# Patient Record
Sex: FEMALE | Race: WHITE | NOT HISPANIC OR LATINO | Employment: OTHER | URBAN - METROPOLITAN AREA
[De-identification: names, ages, dates, MRNs, and addresses within clinical notes are randomized per-mention and may not be internally consistent; named-entity substitution may affect disease eponyms.]

---

## 2023-04-27 ENCOUNTER — TELEPHONE (OUTPATIENT)
Dept: PRIMARY CARE | Facility: CLINIC | Age: 78
End: 2023-04-27
Payer: MEDICARE

## 2023-04-27 DIAGNOSIS — I10 HYPERTENSION, UNSPECIFIED TYPE: ICD-10-CM

## 2023-04-27 RX ORDER — LISINOPRIL 5 MG/1
TABLET ORAL
Qty: 90 TABLET | Refills: 1 | Status: SHIPPED | OUTPATIENT
Start: 2023-04-27 | End: 2023-10-20 | Stop reason: SDUPTHER

## 2023-04-27 RX ORDER — LISINOPRIL 5 MG/1
TABLET ORAL
COMMUNITY
Start: 2020-01-06 | End: 2023-04-27 | Stop reason: SDUPTHER

## 2023-04-27 RX ORDER — LISINOPRIL 2.5 MG/1
TABLET ORAL
Qty: 90 TABLET | Refills: 1 | Status: SHIPPED | OUTPATIENT
Start: 2023-04-27 | End: 2023-10-20 | Stop reason: SDUPTHER

## 2023-04-27 RX ORDER — LISINOPRIL 2.5 MG/1
TABLET ORAL
COMMUNITY
Start: 2022-06-27 | End: 2023-04-27 | Stop reason: SDUPTHER

## 2023-04-27 NOTE — TELEPHONE ENCOUNTER
Pt needs lisinopril 90 days with refills sent to [facility]

## 2023-04-28 LAB
NON-UH HIE A/G RATIO: 1.4
NON-UH HIE ALB: 4 G/DL (ref 3.4–5)
NON-UH HIE ALK PHOS: 81 UNIT/L (ref 46–116)
NON-UH HIE BILIRUBIN, TOTAL: 0.9 MG/DL (ref 0.2–1)
NON-UH HIE BUN/CREAT RATIO: 18.8
NON-UH HIE BUN: 15 MG/DL (ref 9–23)
NON-UH HIE CALCIUM: 9.3 MG/DL (ref 8.7–10.4)
NON-UH HIE CALCULATED LDL CHOLESTEROL: 107 MG/DL (ref 60–130)
NON-UH HIE CALCULATED OSMOLALITY: 283 MOSM/KG (ref 275–295)
NON-UH HIE CHLORIDE: 108 MMOL/L (ref 98–107)
NON-UH HIE CHOLESTEROL: 207 MG/DL (ref 100–200)
NON-UH HIE CO2, VENOUS: 26 MMOL/L (ref 20–31)
NON-UH HIE CREATININE: 0.8 MG/DL (ref 0.5–0.8)
NON-UH HIE FREE T4: 1.45 NG/DL (ref 0.89–1.76)
NON-UH HIE GFR AA: >60
NON-UH HIE GLOBULIN: 2.9 G/DL
NON-UH HIE GLOMERULAR FILTRATION RATE: >60 ML/MIN/1.73M?
NON-UH HIE GLUCOSE: 88 MG/DL (ref 74–106)
NON-UH HIE GOT: 18 UNIT/L (ref 15–37)
NON-UH HIE GPT: 12 UNIT/L (ref 10–49)
NON-UH HIE HDL CHOLESTEROL: 77 MG/DL (ref 40–60)
NON-UH HIE K: 4 MMOL/L (ref 3.5–5.1)
NON-UH HIE NA: 142 MMOL/L (ref 135–145)
NON-UH HIE TOTAL CHOL/HDL CHOL RATIO: 2.7
NON-UH HIE TOTAL PROTEIN: 6.9 G/DL (ref 5.7–8.2)
NON-UH HIE TRIGLYCERIDES: 113 MG/DL (ref 30–150)
NON-UH HIE TSH: 2.51 UIU/ML (ref 0.55–4.78)

## 2023-05-02 ENCOUNTER — TELEPHONE (OUTPATIENT)
Dept: PRIMARY CARE | Facility: CLINIC | Age: 78
End: 2023-05-02
Payer: MEDICARE

## 2023-05-02 NOTE — TELEPHONE ENCOUNTER
----- Message from Chetna Parmar DO sent at 4/30/2023  5:46 PM EDT -----  Please let pt know that her sugar, electrolytes, thyroid, liver, and kidney function are all normal and cholesterol was good.

## 2023-06-05 DIAGNOSIS — E03.9 HYPOTHYROIDISM, UNSPECIFIED TYPE: ICD-10-CM

## 2023-06-05 RX ORDER — LEVOTHYROXINE SODIUM 50 UG/1
50 TABLET ORAL
Qty: 90 TABLET | Refills: 1 | Status: SHIPPED | OUTPATIENT
Start: 2023-06-05 | End: 2023-11-20

## 2023-06-05 RX ORDER — LEVOTHYROXINE SODIUM 50 UG/1
1 TABLET ORAL
COMMUNITY
Start: 2016-09-22 | End: 2023-06-05 | Stop reason: SDUPTHER

## 2023-06-05 NOTE — TELEPHONE ENCOUNTER
Patient need a refill on her lyvothyrozine 90 day supply sent to [facility] mail order.

## 2023-06-09 ENCOUNTER — TELEPHONE (OUTPATIENT)
Dept: PRIMARY CARE | Facility: CLINIC | Age: 78
End: 2023-06-09
Payer: MEDICARE

## 2023-06-09 NOTE — TELEPHONE ENCOUNTER
Patient got her levethyroxine  50mg. But she needs the 75mg sent to [facility] mail   [facility] AT Portal to Registered [facility] Sites Phone: [phone]   Fax: [phone]

## 2023-06-12 NOTE — TELEPHONE ENCOUNTER
Patient says she takes the 50mg and the 75mg.  She alternates doses.   She has enough of the 50mg but she needs more of the 75mg. She wants it sent to [facility]. She wants Myra to call her to clarify how she takes the drugs. She says she has been taking both for years.

## 2023-06-13 DIAGNOSIS — E03.9 HYPOTHYROIDISM, UNSPECIFIED TYPE: ICD-10-CM

## 2023-06-13 RX ORDER — LEVOTHYROXINE SODIUM 75 UG/1
75 TABLET ORAL EVERY OTHER DAY
Qty: 45 TABLET | Refills: 1 | Status: SHIPPED | OUTPATIENT
Start: 2023-06-13 | End: 2023-11-20

## 2023-07-18 ENCOUNTER — TELEPHONE (OUTPATIENT)
Dept: PRIMARY CARE | Facility: CLINIC | Age: 78
End: 2023-07-18
Payer: MEDICARE

## 2023-07-18 NOTE — TELEPHONE ENCOUNTER
Pt would like blood work order because she is feeling very tired and she would like her thyroid checked also. Then she would like her order sent to her home.

## 2023-07-19 DIAGNOSIS — I10 BENIGN ESSENTIAL HTN: ICD-10-CM

## 2023-07-19 DIAGNOSIS — D72.819 LEUKOPENIA, UNSPECIFIED TYPE: ICD-10-CM

## 2023-07-19 DIAGNOSIS — E78.5 HYPERLIPIDEMIA, UNSPECIFIED HYPERLIPIDEMIA TYPE: ICD-10-CM

## 2023-07-19 DIAGNOSIS — E03.9 HYPOTHYROIDISM, UNSPECIFIED TYPE: ICD-10-CM

## 2023-08-04 LAB
NON-UH HIE A/G RATIO: 1.3
NON-UH HIE ALB: 4.1 G/DL (ref 3.4–5)
NON-UH HIE ALK PHOS: 87 UNIT/L (ref 46–116)
NON-UH HIE BASO COUNT: 0.04 X1000 (ref 0–0.2)
NON-UH HIE BASOS %: 1 %
NON-UH HIE BILIRUBIN, TOTAL: 0.9 MG/DL (ref 0.2–1)
NON-UH HIE BUN/CREAT RATIO: 16.2
NON-UH HIE BUN: 13 MG/DL (ref 9–23)
NON-UH HIE CALCIUM: 9.7 MG/DL (ref 8.7–10.4)
NON-UH HIE CALCULATED LDL CHOLESTEROL: 123 MG/DL (ref 60–130)
NON-UH HIE CALCULATED OSMOLALITY: 281 MOSM/KG (ref 275–295)
NON-UH HIE CHLORIDE: 107 MMOL/L (ref 98–107)
NON-UH HIE CHOLESTEROL: 222 MG/DL (ref 100–200)
NON-UH HIE CO2, VENOUS: 26 MMOL/L (ref 20–31)
NON-UH HIE CREATININE: 0.8 MG/DL (ref 0.5–0.8)
NON-UH HIE DIFF?: NO
NON-UH HIE EOS COUNT: 0.07 X1000 (ref 0–0.5)
NON-UH HIE EOSIN %: 2 %
NON-UH HIE FREE T4: 1.42 NG/DL (ref 0.89–1.76)
NON-UH HIE GFR AA: >60
NON-UH HIE GLOBULIN: 3.1 G/DL
NON-UH HIE GLOMERULAR FILTRATION RATE: >60 ML/MIN/1.73M?
NON-UH HIE GLUCOSE: 86 MG/DL (ref 74–106)
NON-UH HIE GOT: 20 UNIT/L (ref 15–37)
NON-UH HIE GPT: 13 UNIT/L (ref 10–49)
NON-UH HIE HCT: 40.9 % (ref 36–46)
NON-UH HIE HDL CHOLESTEROL: 76 MG/DL (ref 40–60)
NON-UH HIE HGB: 13.8 G/DL (ref 12–16)
NON-UH HIE INSTR WBC: 3.6
NON-UH HIE K: 4.2 MMOL/L (ref 3.5–5.1)
NON-UH HIE LYMPH %: 42.1 %
NON-UH HIE LYMPH COUNT: 1.53 X1000 (ref 1.2–4.8)
NON-UH HIE MCH: 30.4 PG (ref 27–34)
NON-UH HIE MCHC: 33.8 G/DL (ref 32–37)
NON-UH HIE MCV: 89.9 FL (ref 80–100)
NON-UH HIE MONO %: 6.8 %
NON-UH HIE MONO COUNT: 0.25 X1000 (ref 0.1–1)
NON-UH HIE MPV: 7.2 FL (ref 7.4–10.4)
NON-UH HIE NA: 141 MMOL/L (ref 135–145)
NON-UH HIE NEUTROPHIL %: 48.1 %
NON-UH HIE NEUTROPHIL COUNT (ANC): 1.75 X1000 (ref 1.4–8.8)
NON-UH HIE NUCLEATED RBC: 0 /100WBC
NON-UH HIE PLATELET: 219 X10 (ref 150–450)
NON-UH HIE RBC: 4.55 X10 (ref 4.2–5.4)
NON-UH HIE RDW: 13.1 % (ref 11.5–14.5)
NON-UH HIE TOTAL CHOL/HDL CHOL RATIO: 2.9
NON-UH HIE TOTAL PROTEIN: 7.2 G/DL (ref 5.7–8.2)
NON-UH HIE TRIGLYCERIDES: 113 MG/DL (ref 30–150)
NON-UH HIE TSH: 3.18 UIU/ML (ref 0.55–4.78)
NON-UH HIE WBC: 3.6 X10 (ref 4.5–11)

## 2023-08-07 ENCOUNTER — TELEPHONE (OUTPATIENT)
Dept: PRIMARY CARE | Facility: CLINIC | Age: 78
End: 2023-08-07
Payer: MEDICARE

## 2023-08-07 NOTE — TELEPHONE ENCOUNTER
----- Message from Chetna Parmar DO sent at 8/6/2023  8:35 PM EDT -----  Please let pt know that her sugar, electrolytes, thyroid, liver, and kidney function are all normal.   Her white blood cell count was again slightly low but stable.   Her cholesterol was slightly elevated.   I recommend a healthy diet and exercise and we will cont to monitor

## 2023-08-24 ENCOUNTER — TELEPHONE (OUTPATIENT)
Dept: PRIMARY CARE | Facility: CLINIC | Age: 78
End: 2023-08-24
Payer: MEDICARE

## 2023-08-24 DIAGNOSIS — K21.9 GASTROESOPHAGEAL REFLUX DISEASE WITHOUT ESOPHAGITIS: ICD-10-CM

## 2023-08-24 RX ORDER — PANTOPRAZOLE SODIUM 20 MG/1
20 TABLET, DELAYED RELEASE ORAL EVERY OTHER DAY
Qty: 45 TABLET | Refills: 1 | Status: SHIPPED | OUTPATIENT
Start: 2023-08-24 | End: 2024-03-04

## 2023-08-24 RX ORDER — PANTOPRAZOLE SODIUM 20 MG/1
1 TABLET, DELAYED RELEASE ORAL EVERY OTHER DAY
COMMUNITY
Start: 2014-06-03 | End: 2023-08-24 | Stop reason: SDUPTHER

## 2023-08-24 NOTE — TELEPHONE ENCOUNTER
Patient needs a refill on her panroprizole 20mg for her stomach gastic problems.  She needs a 3 month supply sent to [facility] [place] [facility] - [facility] AT Portal to Registered [facility] Sites   Phone: [phone]

## 2023-09-05 ENCOUNTER — TELEPHONE (OUTPATIENT)
Dept: PRIMARY CARE | Facility: CLINIC | Age: 78
End: 2023-09-05
Payer: MEDICARE

## 2023-10-03 ENCOUNTER — CLINICAL SUPPORT (OUTPATIENT)
Dept: PRIMARY CARE | Facility: CLINIC | Age: 78
End: 2023-10-03
Payer: MEDICARE

## 2023-10-03 DIAGNOSIS — Z23 ENCOUNTER FOR IMMUNIZATION: ICD-10-CM

## 2023-10-03 PROCEDURE — 90662 IIV NO PRSV INCREASED AG IM: CPT | Performed by: FAMILY MEDICINE

## 2023-10-03 PROCEDURE — G0008 ADMIN INFLUENZA VIRUS VAC: HCPCS | Performed by: FAMILY MEDICINE

## 2023-10-10 ENCOUNTER — HOSPITAL ENCOUNTER (OUTPATIENT)
Dept: RADIOLOGY | Facility: EXTERNAL LOCATION | Age: 78
Discharge: HOME | End: 2023-10-10

## 2023-10-10 ENCOUNTER — OFFICE VISIT (OUTPATIENT)
Dept: PRIMARY CARE | Facility: CLINIC | Age: 78
End: 2023-10-10
Payer: MEDICARE

## 2023-10-10 VITALS
HEART RATE: 70 BPM | HEIGHT: 59 IN | SYSTOLIC BLOOD PRESSURE: 138 MMHG | BODY MASS INDEX: 24.52 KG/M2 | WEIGHT: 121.6 LBS | TEMPERATURE: 97.1 F | DIASTOLIC BLOOD PRESSURE: 72 MMHG

## 2023-10-10 DIAGNOSIS — Z12.31 BREAST CANCER SCREENING BY MAMMOGRAM: ICD-10-CM

## 2023-10-10 DIAGNOSIS — E78.5 HYPERLIPIDEMIA, UNSPECIFIED HYPERLIPIDEMIA TYPE: ICD-10-CM

## 2023-10-10 DIAGNOSIS — M25.562 LEFT KNEE PAIN, UNSPECIFIED CHRONICITY: ICD-10-CM

## 2023-10-10 DIAGNOSIS — E03.9 HYPOTHYROIDISM, UNSPECIFIED TYPE: ICD-10-CM

## 2023-10-10 DIAGNOSIS — D72.819 LEUKOPENIA, UNSPECIFIED TYPE: ICD-10-CM

## 2023-10-10 DIAGNOSIS — I10 BENIGN ESSENTIAL HTN: ICD-10-CM

## 2023-10-10 DIAGNOSIS — Z00.00 MEDICARE ANNUAL WELLNESS VISIT, SUBSEQUENT: Primary | ICD-10-CM

## 2023-10-10 DIAGNOSIS — E55.9 VITAMIN D DEFICIENCY: ICD-10-CM

## 2023-10-10 PROBLEM — I72.8 ANEURYSM, SPLENIC ARTERY (CMS-HCC): Status: ACTIVE | Noted: 2023-10-10

## 2023-10-10 PROBLEM — K63.5 COLON POLYPS: Status: ACTIVE | Noted: 2023-10-10

## 2023-10-10 PROBLEM — R53.83 FATIGUE: Status: ACTIVE | Noted: 2023-10-10

## 2023-10-10 PROCEDURE — 1036F TOBACCO NON-USER: CPT | Performed by: FAMILY MEDICINE

## 2023-10-10 PROCEDURE — 1159F MED LIST DOCD IN RCRD: CPT | Performed by: FAMILY MEDICINE

## 2023-10-10 PROCEDURE — 1160F RVW MEDS BY RX/DR IN RCRD: CPT | Performed by: FAMILY MEDICINE

## 2023-10-10 PROCEDURE — 3075F SYST BP GE 130 - 139MM HG: CPT | Performed by: FAMILY MEDICINE

## 2023-10-10 PROCEDURE — G0439 PPPS, SUBSEQ VISIT: HCPCS | Performed by: FAMILY MEDICINE

## 2023-10-10 PROCEDURE — 1170F FXNL STATUS ASSESSED: CPT | Performed by: FAMILY MEDICINE

## 2023-10-10 PROCEDURE — 3078F DIAST BP <80 MM HG: CPT | Performed by: FAMILY MEDICINE

## 2023-10-10 RX ORDER — CHOLECALCIFEROL (VITAMIN D3) 50 MCG
TABLET ORAL
COMMUNITY
Start: 2018-07-23

## 2023-10-10 RX ORDER — ASPIRIN 81 MG/1
1 TABLET ORAL EVERY OTHER DAY
COMMUNITY
Start: 2016-06-20

## 2023-10-10 RX ORDER — PRAVASTATIN SODIUM 20 MG/1
20 TABLET ORAL EVERY 24 HOURS
COMMUNITY
Start: 2019-02-14 | End: 2023-10-20 | Stop reason: SDUPTHER

## 2023-10-10 ASSESSMENT — PATIENT HEALTH QUESTIONNAIRE - PHQ9
SUM OF ALL RESPONSES TO PHQ9 QUESTIONS 1 AND 2: 0
1. LITTLE INTEREST OR PLEASURE IN DOING THINGS: NOT AT ALL
2. FEELING DOWN, DEPRESSED OR HOPELESS: NOT AT ALL

## 2023-10-10 ASSESSMENT — ACTIVITIES OF DAILY LIVING (ADL)
MANAGING_FINANCES: INDEPENDENT
DOING_HOUSEWORK: INDEPENDENT
DRESSING: INDEPENDENT
GROCERY_SHOPPING: INDEPENDENT
BATHING: INDEPENDENT
TAKING_MEDICATION: INDEPENDENT

## 2023-10-10 NOTE — PROGRESS NOTES
Subjective   Reason for Visit: Shante Granados is an 78 y.o. female here for a Medicare Wellness visit.     Past Medical, Surgical, and Family History reviewed and updated in chart.    Reviewed all medications by prescribing practitioner or clinical pharmacist (such as prescriptions, OTCs, herbal therapies and supplements) and documented in the medical record.    HPI  79 yo female presents for medicare wellness visit       recent labs 8/4/23  vit D, cmp, Tsh/ free T4 wnl    WBC 3.6     hx HTN- doing well on lisinopril 7.5mg;   occ checks BP- 107- 110s/70s at home      hx HLD- on pravastatin; watches diet and stays active.     hx hypothyroidism-on levothyroxine 50mcg/75mcg alternating  thyroid biopsy in 20s-diagnosed with hashimotos. no neck changes.   c/o fatigue     hx leukopenia- hasn't seen a heme for in past. has been stable for yrs.      2010 dexa-osteopenia   defers f/u DEXA b/c wont take any meds for it.   takes vit D   does not take a Ca supplement - due to hx of kidney stones        sees gyn for exams/pelvics every other yr- dr avila- recently saw gyn and has order for mammo  hx ovarian cysts  10/2022 mammo; no breast changes. does reg self breast exams     colonoscopy 5/2019- +polyps. f/u 5 yrs. no bowel changes. Due 5/2024     hx barretts in past but recent EGD 5/2019 was neg for barretts; GI dr key; on pantoprazole 20mg QOD; seems to control reflux.         Zostavax 2010; shingrix-had  Pneumovax at 65yo   had prevnar 13 in nov 2015  flu shot- had  covid shots- had     wears glasses and sees optho. dr guajardo;   She has seen her dentist for regular cleanings   She denies any chest pains, palpitations, edema, shortness of breath, abdominal pains, nausea, vomiting, diarrhea, constipation, blood in stool, melena.   Sees derm for skin checks. dr salas. hx rosacea     hx of splenic aneurysm- sees vascular dr gonzalez 2020;   Saw dr kwon neurosjayleen for possible aneurysm- monitoring    GI dr key-  "liver cyts were stable.      Co left knee pain- worsening; with stairs.  Husbands ortho-dr catherine           /72   Pulse 70   Temp 36.2 °C (97.1 °F)   Ht 1.499 m (4' 11\")   Wt 55.2 kg (121 lb 9.6 oz)   BMI 24.56 kg/m²     Lab Results   Component Value Date    WBC 3.8 (L) 03/12/2020    HGB 14.0 03/12/2020    HCT 42.4 03/12/2020    MCV 90 03/12/2020     03/12/2020       Chemistry    Lab Results   Component Value Date/Time     03/12/2020 1146    K 4.0 03/12/2020 1146     03/12/2020 1146    CO2 25 03/12/2020 1146    BUN 11 03/12/2020 1146    CREATININE 0.69 03/12/2020 1146    Lab Results   Component Value Date/Time    CALCIUM 9.9 03/12/2020 1146    ALKPHOS 76 03/12/2020 1146    AST 21 03/12/2020 1146    ALT 16 03/12/2020 1146    BILITOT 0.9 03/12/2020 1146           Lab Results   Component Value Date    CHOL 235 (H) 03/12/2020    CHOL 225 (H) 08/29/2019    CHOL 232 (H) 04/29/2019     Lab Results   Component Value Date    HDL 76.9 03/12/2020    HDL 74.3 08/29/2019    HDL 70.6 04/29/2019     No results found for: \"LDLCALC\"  Lab Results   Component Value Date    TRIG 93 03/12/2020    TRIG 129 08/29/2019    TRIG 161 (H) 04/29/2019     No components found for: \"CHOLHDL\"    Patient Self Assessment of Health Status  Patient Self Assessment: Good    Nutrition and Exercise  Current Diet: Well Balanced Diet  Adequate Fluid Intake: Yes  Caffeine: Yes  Exercise Frequency: Regularly    Functional Ability/Level of Safety  Cognitive Impairment Observed: No cognitive impairment observed    Home Safety Risk Factors: None    Patient Care Team:  Chetna Parmar DO as PCP - General  Chetna Parmar DO as PCP - [facility] Attributed Provider     Review of Systems  ROS as stated in HPI    Medicare Wellness Billing Compliance Satisfied    *This is a visual tool to show completion of required items on the day of the visit. Green checks will only appear on the date of visit.      Objective   Vitals:  /72   " "Pulse 70   Temp 36.2 °C (97.1 °F)   Ht 1.499 m (4' 11\")   Wt 55.2 kg (121 lb 9.6 oz)   BMI 24.56 kg/m²       Physical Exam  Constitutional: A&O; NAD  HEENT: conjunctiva normal. EOMI; PERRLA; lids normal; TM's clear;   Neck: supple; No lymphadenopathy   Heart: RRR     Lungs: CTA bilateral   Abd: Soft, Nontender, Nondistended  Ext:  No edema;    Neuro: No gross neuro deficits     Psych: Judgment, orientation, mood, affect, insight wnl   Assessment/Plan   Problem List Items Addressed This Visit       Hypothyroid    Relevant Orders    Thyroid Stimulating Hormone    T4, free    Benign essential HTN    Relevant Orders    Comprehensive Metabolic Panel    Hyperlipidemia    Relevant Orders    Lipid Panel    Comprehensive Metabolic Panel    Leukopenia    Relevant Orders    CBC and Auto Differential    Vitamin D deficiency    Relevant Orders    Vitamin D 25-Hydroxy,Total (for eval of Vitamin D levels)     Other Visit Diagnoses       Medicare annual wellness visit, subsequent    -  Primary    Breast cancer screening by mammogram        Relevant Orders    BI mammo bilateral screening tomosynthesis (Completed)    Left knee pain, unspecified chronicity        Relevant Orders    Referral to Physical Therapy    XR knee left 4+ views (Completed)        reviewed recent labs from aug  Recheck labs in a few mo  sees gyn for exams every other yr- dr avila   10/2022 mammo- recheck mammo- has order from gyn  colonoscopy 5/2019- f/u 5 yrs due 5/2024  immunizations UTD  defers f/u bone density test.  recommend Calcium + Vitamin D supplement.   healthy lifestyle-diet, exercise.   cont to monitor BP   f/u with vascular/neurosurg   check xray left knee and refer to PT; fu with ortho catherine if persists.       "

## 2023-10-20 DIAGNOSIS — E78.5 HYPERLIPIDEMIA, UNSPECIFIED HYPERLIPIDEMIA TYPE: ICD-10-CM

## 2023-10-20 DIAGNOSIS — I10 HYPERTENSION, UNSPECIFIED TYPE: ICD-10-CM

## 2023-10-20 RX ORDER — PRAVASTATIN SODIUM 20 MG/1
20 TABLET ORAL DAILY
Qty: 90 TABLET | Refills: 1 | Status: SHIPPED | OUTPATIENT
Start: 2023-10-20 | End: 2024-01-09 | Stop reason: SDUPTHER

## 2023-10-20 RX ORDER — LISINOPRIL 2.5 MG/1
TABLET ORAL
Qty: 90 TABLET | Refills: 1 | Status: SHIPPED | OUTPATIENT
Start: 2023-10-20 | End: 2024-01-09 | Stop reason: SDUPTHER

## 2023-10-20 RX ORDER — LISINOPRIL 5 MG/1
TABLET ORAL
Qty: 90 TABLET | Refills: 1 | Status: SHIPPED | OUTPATIENT
Start: 2023-10-20 | End: 2024-01-09 | Stop reason: SDUPTHER

## 2023-11-20 DIAGNOSIS — E03.9 HYPOTHYROIDISM, UNSPECIFIED TYPE: ICD-10-CM

## 2023-11-20 RX ORDER — LEVOTHYROXINE SODIUM 50 UG/1
50 TABLET ORAL EVERY MORNING
Qty: 90 TABLET | Refills: 1 | Status: SHIPPED | OUTPATIENT
Start: 2023-11-20

## 2023-11-20 RX ORDER — LEVOTHYROXINE SODIUM 75 UG/1
75 TABLET ORAL EVERY OTHER DAY
Qty: 45 TABLET | Refills: 1 | Status: SHIPPED | OUTPATIENT
Start: 2023-11-20

## 2023-12-18 ENCOUNTER — TELEPHONE (OUTPATIENT)
Dept: PRIMARY CARE | Facility: CLINIC | Age: 78
End: 2023-12-18
Payer: MEDICARE

## 2023-12-18 NOTE — TELEPHONE ENCOUNTER
----- Message from Chetna Parmar DO sent at 12/15/2023 12:58 PM EST -----  Let pt know the xray of her knee showed mild to moderate osteoarthritic changes.  I recommend she fu with her ortho if her symptoms persist    ----- Message -----  From: Dior Garvey  Sent: 12/14/2023  12:46 PM EST  To: Chetna Parmar DO

## 2024-01-03 LAB
NON-UH HIE A/G RATIO: 1.3
NON-UH HIE ALB: 4 G/DL (ref 3.4–5)
NON-UH HIE ALK PHOS: 82 UNIT/L (ref 45–117)
NON-UH HIE BASO COUNT: 0.04 X1000 (ref 0–0.2)
NON-UH HIE BASOS %: 1.2 %
NON-UH HIE BILIRUBIN, TOTAL: 1 MG/DL (ref 0.3–1.2)
NON-UH HIE BUN/CREAT RATIO: 15
NON-UH HIE BUN: 12 MG/DL (ref 9–23)
NON-UH HIE CALCIUM: 9.3 MG/DL (ref 8.7–10.4)
NON-UH HIE CALCULATED LDL CHOLESTEROL: 116 MG/DL (ref 60–130)
NON-UH HIE CALCULATED OSMOLALITY: 282 MOSM/KG (ref 275–295)
NON-UH HIE CHLORIDE: 108 MMOL/L (ref 98–107)
NON-UH HIE CHOLESTEROL: 225 MG/DL (ref 100–200)
NON-UH HIE CO2, VENOUS: 28 MMOL/L (ref 20–31)
NON-UH HIE CREATININE: 0.8 MG/DL (ref 0.5–0.8)
NON-UH HIE DIFF?: NO
NON-UH HIE EOS COUNT: 0.07 X1000 (ref 0–0.5)
NON-UH HIE EOSIN %: 1.9 %
NON-UH HIE FREE T4: 1.59 NG/DL (ref 0.89–1.76)
NON-UH HIE GFR AA: >60
NON-UH HIE GLOBULIN: 3 G/DL
NON-UH HIE GLOMERULAR FILTRATION RATE: >60 ML/MIN/1.73M?
NON-UH HIE GLUCOSE: 90 MG/DL (ref 74–106)
NON-UH HIE GOT: 21 UNIT/L (ref 15–37)
NON-UH HIE GPT: 14 UNIT/L (ref 10–49)
NON-UH HIE HCT: 40.2 % (ref 36–46)
NON-UH HIE HDL CHOLESTEROL: 90 MG/DL (ref 40–60)
NON-UH HIE HGB: 13.6 G/DL (ref 12–16)
NON-UH HIE INSTR WBC: 3.5
NON-UH HIE K: 4 MMOL/L (ref 3.5–5.1)
NON-UH HIE LYMPH %: 36.3 %
NON-UH HIE LYMPH COUNT: 1.28 X1000 (ref 1.2–4.8)
NON-UH HIE MCH: 30.5 PG (ref 27–34)
NON-UH HIE MCHC: 33.7 G/DL (ref 32–37)
NON-UH HIE MCV: 90.6 FL (ref 80–100)
NON-UH HIE MONO %: 7.5 %
NON-UH HIE MONO COUNT: 0.26 X1000 (ref 0.1–1)
NON-UH HIE MPV: 7.1 FL (ref 7.4–10.4)
NON-UH HIE NA: 142 MMOL/L (ref 135–145)
NON-UH HIE NEUTROPHIL %: 53.1 %
NON-UH HIE NEUTROPHIL COUNT (ANC): 1.87 X1000 (ref 1.4–8.8)
NON-UH HIE NUCLEATED RBC: 0 /100WBC
NON-UH HIE PLATELET: 220 X10 (ref 150–450)
NON-UH HIE RBC: 4.44 X10 (ref 4.2–5.4)
NON-UH HIE RDW: 13.5 % (ref 11.5–14.5)
NON-UH HIE TOTAL CHOL/HDL CHOL RATIO: 2.5
NON-UH HIE TOTAL PROTEIN: 7 G/DL (ref 5.7–8.2)
NON-UH HIE TRIGLYCERIDES: 96 MG/DL (ref 30–150)
NON-UH HIE TSH: 2.16 UIU/ML (ref 0.55–4.78)
NON-UH HIE VIT D 25: 45 NG/ML
NON-UH HIE WBC: 3.5 X10 (ref 4.5–11)

## 2024-01-09 ENCOUNTER — OFFICE VISIT (OUTPATIENT)
Dept: PRIMARY CARE | Facility: CLINIC | Age: 79
End: 2024-01-09
Payer: MEDICARE

## 2024-01-09 VITALS
WEIGHT: 122 LBS | HEART RATE: 71 BPM | HEIGHT: 60 IN | SYSTOLIC BLOOD PRESSURE: 142 MMHG | TEMPERATURE: 97.9 F | BODY MASS INDEX: 23.95 KG/M2 | DIASTOLIC BLOOD PRESSURE: 83 MMHG

## 2024-01-09 DIAGNOSIS — E78.5 HYPERLIPIDEMIA, UNSPECIFIED HYPERLIPIDEMIA TYPE: ICD-10-CM

## 2024-01-09 DIAGNOSIS — I10 BENIGN ESSENTIAL HTN: Primary | ICD-10-CM

## 2024-01-09 DIAGNOSIS — E55.9 VITAMIN D DEFICIENCY: ICD-10-CM

## 2024-01-09 DIAGNOSIS — I10 HYPERTENSION, UNSPECIFIED TYPE: ICD-10-CM

## 2024-01-09 DIAGNOSIS — E03.9 HYPOTHYROIDISM, UNSPECIFIED TYPE: ICD-10-CM

## 2024-01-09 DIAGNOSIS — D72.819 LEUKOPENIA, UNSPECIFIED TYPE: ICD-10-CM

## 2024-01-09 PROCEDURE — 3079F DIAST BP 80-89 MM HG: CPT | Performed by: FAMILY MEDICINE

## 2024-01-09 PROCEDURE — 99214 OFFICE O/P EST MOD 30 MIN: CPT | Performed by: FAMILY MEDICINE

## 2024-01-09 PROCEDURE — 1036F TOBACCO NON-USER: CPT | Performed by: FAMILY MEDICINE

## 2024-01-09 PROCEDURE — 1159F MED LIST DOCD IN RCRD: CPT | Performed by: FAMILY MEDICINE

## 2024-01-09 PROCEDURE — 1158F ADVNC CARE PLAN TLK DOCD: CPT | Performed by: FAMILY MEDICINE

## 2024-01-09 PROCEDURE — 3077F SYST BP >= 140 MM HG: CPT | Performed by: FAMILY MEDICINE

## 2024-01-09 PROCEDURE — 1160F RVW MEDS BY RX/DR IN RCRD: CPT | Performed by: FAMILY MEDICINE

## 2024-01-09 PROCEDURE — 1123F ACP DISCUSS/DSCN MKR DOCD: CPT | Performed by: FAMILY MEDICINE

## 2024-01-09 RX ORDER — PRAVASTATIN SODIUM 20 MG/1
20 TABLET ORAL DAILY
Qty: 90 TABLET | Refills: 3 | Status: SHIPPED | OUTPATIENT
Start: 2024-01-09

## 2024-01-09 RX ORDER — LISINOPRIL 5 MG/1
TABLET ORAL
Qty: 90 TABLET | Refills: 3 | Status: SHIPPED | OUTPATIENT
Start: 2024-01-09

## 2024-01-09 RX ORDER — LISINOPRIL 2.5 MG/1
TABLET ORAL
Qty: 90 TABLET | Refills: 3 | Status: SHIPPED | OUTPATIENT
Start: 2024-01-09

## 2024-01-09 NOTE — PROGRESS NOTES
Subjective   Patient ID: Shante Granados is a 78 y.o. female who presents for Follow-up (Follow up hypertension;  checking BP's, they are good).    HPI   79 yo female presents for fu    Was seen 10/2023 for medicare wellness visit       recent labs       Cmp, vit D, tsh, free t4 wnl  WBC 3.5     8/4/23  vit D, cmp, Tsh/ free T4 wnl    WBC 3.6     hx HTN- doing well on lisinopril 7.5mg;   occ checks BP- 107- 110s/70s at home      hx HLD- on pravastatin; watches diet and stays active.     hx hypothyroidism-on levothyroxine 50mcg/75mcg alternating  thyroid biopsy in 20s-diagnosed with hashimotos. no neck changes.      hx leukopenia- hasn't seen a heme for in past. has been stable for yrs.      2010 dexa-osteopenia   defers f/u DEXA b/c wont take any meds for it.   takes vit D   does not take a Ca supplement - due to hx of kidney stones     sees gyn for exams/pelvics every other yr- dr avila- saw her in nov.   hx ovarian cysts  Mammo UTD    colonoscopy 5/2019- +polyps. f/u 5 yrs. no bowel changes. Due 5/2024     hx barretts in past but recent EGD 5/2019 was neg for barretts; GI dr key; on pantoprazole 20mg QOD; seems to control reflux.         Zostavax 2010; shingrix-had  Pneumovax at 67yo   had prevnar 13 in nov 2015  flu shot- had  covid shots- had  RSV- not yet      She denies any chest pains, palpitations, edema, shortness of breath, abdominal pains, nausea, vomiting, diarrhea, constipation, blood in stool, melena.     Sees derm for skin checks. dr salas. hx rosacea         Review of Systems  ROS as stated in HPI    Objective   /83   Pulse 71   Temp 36.6 °C (97.9 °F) (Temporal)   Ht 1.524 m (5')   Wt 55.3 kg (122 lb)   BMI 23.83 kg/m²     Physical Exam  Constitutional: A&O; NAD  HEENT: conjunctiva normal. EOMI; PERRLA; lids normal; TM's clear;   Neck: supple; No lymphadenopathy   Heart: RRR     Lungs: CTA bilateral   Abd: Soft, Nontender, Nondistended  Ext:  No edema;    Neuro:  No gross neuro deficits     Psych: Judgment, orientation, mood, affect, insight wnl   Assessment/Plan   Problem List Items Addressed This Visit             ICD-10-CM    Hypothyroid E03.9    Relevant Orders    Thyroid Stimulating Hormone    T4, free    Benign essential HTN - Primary I10    Relevant Medications    lisinopril 2.5 mg tablet    lisinopril 5 mg tablet    Other Relevant Orders    Comprehensive Metabolic Panel    Hyperlipidemia E78.5    Relevant Medications    pravastatin (Pravachol) 20 mg tablet    Other Relevant Orders    Lipid Panel    Comprehensive Metabolic Panel    Leukopenia D72.819    Relevant Orders    CBC and Auto Differential    Vitamin D deficiency E55.9    Relevant Orders    Vitamin D 25-Hydroxy,Total (for eval of Vitamin D levels)     Other Visit Diagnoses         Codes    Hypertension, unspecified type     I10    Relevant Medications    lisinopril 2.5 mg tablet    lisinopril 5 mg tablet          reviewed recent labs  colonoscopy 5/2019- f/u 5 yrs due 5/2024  RSV recommended; other immunizations UTD  defers f/u bone density test.  recommend Calcium + Vitamin D supplement.   healthy lifestyle-diet, exercise.   cont to monitor BP  Fu for MWV or sooner if needed

## 2024-03-04 DIAGNOSIS — K21.9 GASTROESOPHAGEAL REFLUX DISEASE WITHOUT ESOPHAGITIS: ICD-10-CM

## 2024-03-04 RX ORDER — PANTOPRAZOLE SODIUM 20 MG/1
20 TABLET, DELAYED RELEASE ORAL EVERY OTHER DAY
Qty: 45 TABLET | Refills: 1 | Status: SHIPPED | OUTPATIENT
Start: 2024-03-04 | End: 2024-03-05 | Stop reason: SDUPTHER

## 2024-03-05 RX ORDER — PANTOPRAZOLE SODIUM 20 MG/1
20 TABLET, DELAYED RELEASE ORAL EVERY OTHER DAY
Qty: 45 TABLET | Refills: 1 | Status: SHIPPED | OUTPATIENT
Start: 2024-03-05

## 2024-04-03 ENCOUNTER — OFFICE VISIT (OUTPATIENT)
Dept: PRIMARY CARE | Facility: CLINIC | Age: 79
End: 2024-04-03
Payer: MEDICARE

## 2024-04-03 VITALS
TEMPERATURE: 97.5 F | HEIGHT: 60 IN | WEIGHT: 124.2 LBS | HEART RATE: 90 BPM | OXYGEN SATURATION: 98 % | DIASTOLIC BLOOD PRESSURE: 87 MMHG | SYSTOLIC BLOOD PRESSURE: 150 MMHG | BODY MASS INDEX: 24.39 KG/M2

## 2024-04-03 DIAGNOSIS — M54.2 NECK PAIN: Primary | ICD-10-CM

## 2024-04-03 PROCEDURE — 1036F TOBACCO NON-USER: CPT | Performed by: NURSE PRACTITIONER

## 2024-04-03 PROCEDURE — 99213 OFFICE O/P EST LOW 20 MIN: CPT | Performed by: NURSE PRACTITIONER

## 2024-04-03 PROCEDURE — 1160F RVW MEDS BY RX/DR IN RCRD: CPT | Performed by: NURSE PRACTITIONER

## 2024-04-03 PROCEDURE — 1123F ACP DISCUSS/DSCN MKR DOCD: CPT | Performed by: NURSE PRACTITIONER

## 2024-04-03 PROCEDURE — 1159F MED LIST DOCD IN RCRD: CPT | Performed by: NURSE PRACTITIONER

## 2024-04-03 PROCEDURE — 3079F DIAST BP 80-89 MM HG: CPT | Performed by: NURSE PRACTITIONER

## 2024-04-03 PROCEDURE — 3077F SYST BP >= 140 MM HG: CPT | Performed by: NURSE PRACTITIONER

## 2024-04-03 RX ORDER — PREDNISONE 20 MG/1
TABLET ORAL
Qty: 15 TABLET | Refills: 0 | Status: SHIPPED
Start: 2024-04-03 | End: 2024-04-25 | Stop reason: ALTCHOICE

## 2024-04-03 ASSESSMENT — PATIENT HEALTH QUESTIONNAIRE - PHQ9
SUM OF ALL RESPONSES TO PHQ9 QUESTIONS 1 AND 2: 0
2. FEELING DOWN, DEPRESSED OR HOPELESS: NOT AT ALL
1. LITTLE INTEREST OR PLEASURE IN DOING THINGS: NOT AT ALL

## 2024-04-03 ASSESSMENT — ENCOUNTER SYMPTOMS
SHORTNESS OF BREATH: 0
WHEEZING: 0
CONSTITUTIONAL NEGATIVE: 1

## 2024-04-03 NOTE — PROGRESS NOTES
Subjective   Patient ID: Shante Granados is a 78 y.o. female who presents for Neck Pain.  Last physical: 10/10/23; has appt scheduled in oct    Bps at pharmacy- 100/70  When did neck pain start? 1.5 to 2 months ago   Where does it hurt on the neck? Rt lower neck and sometimes into back   Any fall or injury? No       HPI  Neck pain x 1.5-2mon  Rt lower neck into rt upper back  On off pain  no fall or injury  no new work activities or exercise routine-arth in knees-went to PT only for knees  Travel to [place] every mon  Nothing makes pain worse  Stress might cause this  pain right now 0/10  pain at worst 3-4/10  no redness, rash, warmth, bruising or swelling  Tightness noted  no numbness, tingling, or weakness down arms  no pain down the arms  no wt loss, fever, or chills  no cp, heart racing, sob, wheezing, HA, dizziness, or vision change  selftxt: 2 advil one day-did not make it go away;might have helped a little; aleve did not help      No care team member to display     Review of Systems   Constitutional: Negative.    Respiratory:  Negative for shortness of breath and wheezing.    Cardiovascular:  Negative for chest pain.       Objective   Visit Vitals  /87   Pulse 90   Temp 36.4 °C (97.5 °F)      BP Readings from Last 3 Encounters:   04/03/24 150/87   01/09/24 142/83   10/10/23 138/72     Wt Readings from Last 3 Encounters:   04/03/24 56.3 kg (124 lb 3.2 oz)   01/09/24 55.3 kg (122 lb)   10/10/23 55.2 kg (121 lb 9.6 oz)       Physical Exam  Constitutional:       General: She is not in acute distress.     Appearance: Normal appearance.   Neurological:      Mental Status: She is alert.       Assessment/Plan   Diagnoses and all orders for this visit:  Neck pain  -     XR cervical spine complete 4-5 views; Future  -     predniSONE (Deltasone) 20 mg tablet; Take 2 tabs daily x 5d then 1 tab daily x 3d then 1/2 tab daily x 3d

## 2024-04-03 NOTE — PATIENT INSTRUCTIONS
Xray cervical  Topical voltaren gel  Stretches  Prednisone with food  No advil, motrin, ibuprofen, aleve, naproxen or other anti-inflammatories while on prednisone.  Tylenol (acetaminophen) is OK to take.   Declines muscle relaxant  Call if not starting to get better in 2 wks---> we can add onto physical therapy for your neck, ultrasound rt upper back, refer to ortho    For cramping: magnesium 400mg 1 at bedtime    Keep oct appt with dr marquis    I will communicate with you via Bench regarding messages and results. If you need help with this, you can call the support line at [phone].    IT WAS A PLEASURE TO SEE YOU TODAY. THANK YOU FOR CHOOSING US FOR YOUR HEALTHCARE NEEDS.

## 2024-04-25 ENCOUNTER — OFFICE VISIT (OUTPATIENT)
Dept: PRIMARY CARE | Facility: CLINIC | Age: 79
End: 2024-04-25
Payer: MEDICARE

## 2024-04-25 VITALS
DIASTOLIC BLOOD PRESSURE: 80 MMHG | TEMPERATURE: 98.5 F | HEART RATE: 78 BPM | HEIGHT: 60 IN | BODY MASS INDEX: 24.11 KG/M2 | SYSTOLIC BLOOD PRESSURE: 131 MMHG | WEIGHT: 122.8 LBS

## 2024-04-25 DIAGNOSIS — J32.9 SINUSITIS, UNSPECIFIED CHRONICITY, UNSPECIFIED LOCATION: Primary | ICD-10-CM

## 2024-04-25 PROCEDURE — 1159F MED LIST DOCD IN RCRD: CPT | Performed by: FAMILY MEDICINE

## 2024-04-25 PROCEDURE — 99213 OFFICE O/P EST LOW 20 MIN: CPT | Performed by: FAMILY MEDICINE

## 2024-04-25 PROCEDURE — 3075F SYST BP GE 130 - 139MM HG: CPT | Performed by: FAMILY MEDICINE

## 2024-04-25 PROCEDURE — 1123F ACP DISCUSS/DSCN MKR DOCD: CPT | Performed by: FAMILY MEDICINE

## 2024-04-25 PROCEDURE — 1160F RVW MEDS BY RX/DR IN RCRD: CPT | Performed by: FAMILY MEDICINE

## 2024-04-25 PROCEDURE — 3079F DIAST BP 80-89 MM HG: CPT | Performed by: FAMILY MEDICINE

## 2024-04-25 PROCEDURE — 1036F TOBACCO NON-USER: CPT | Performed by: FAMILY MEDICINE

## 2024-04-25 RX ORDER — DOXYCYCLINE 100 MG/1
100 CAPSULE ORAL 2 TIMES DAILY
Qty: 20 CAPSULE | Refills: 0 | Status: SHIPPED | OUTPATIENT
Start: 2024-04-25

## 2024-04-25 ASSESSMENT — PATIENT HEALTH QUESTIONNAIRE - PHQ9
1. LITTLE INTEREST OR PLEASURE IN DOING THINGS: NOT AT ALL
SUM OF ALL RESPONSES TO PHQ9 QUESTIONS 1 AND 2: 0
2. FEELING DOWN, DEPRESSED OR HOPELESS: NOT AT ALL

## 2024-04-25 NOTE — PROGRESS NOTES
Subjective   Patient ID: Shante Granados is a 79 y.o. female who presents for Sinusitis (Sinus pressure, ear pressure for a few wks).    HPI   79-year-old female presents complaining of a few week history of persistent sinus congestion, pressure, excessive drainage, ear pressure, postnasal drainage and cough.  Denies any fevers or chills.  Has tried Sudafed and ibuprofen.  No history of allergies.  Complains of fatigue and feeling rundown.      Review of Systems  ROS as stated in HPI  Objective   /80   Pulse 78   Temp 36.9 °C (98.5 °F)   Ht 1.524 m (5')   Wt 55.7 kg (122 lb 12.8 oz)   BMI 23.98 kg/m²     Physical Exam  Constitutional: A&O; NAD  HEENT: conjunctiva normal. EOMI; PERRLA; lids normal; TM's clear; +PND  Neck: supple; No lymphadenopathy   Heart: RRR     Lungs: CTA bilateral   Neuro: No gross neuro deficits     Psych: Judgment, orientation, mood, affect, insight wnl   Assessment/Plan   Problem List Items Addressed This Visit    None  Visit Diagnoses         Codes    Sinusitis, unspecified chronicity, unspecified location    -  Primary J32.9    Relevant Medications    doxycycline (Vibramycin) 100 mg capsule        Supportive treatment.  Rx doxy  Follow-up if symptoms persist or worsen.

## 2024-05-02 ENCOUNTER — TELEPHONE (OUTPATIENT)
Dept: PRIMARY CARE | Facility: CLINIC | Age: 79
End: 2024-05-02
Payer: MEDICARE

## 2024-05-02 DIAGNOSIS — E55.9 VITAMIN D DEFICIENCY: ICD-10-CM

## 2024-05-02 DIAGNOSIS — D64.9 ANEMIA, UNSPECIFIED TYPE: ICD-10-CM

## 2024-05-02 DIAGNOSIS — R53.83 FATIGUE, UNSPECIFIED TYPE: ICD-10-CM

## 2024-05-02 DIAGNOSIS — I10 BENIGN ESSENTIAL HTN: ICD-10-CM

## 2024-05-02 DIAGNOSIS — R79.89 OTHER SPECIFIED ABNORMAL FINDINGS OF BLOOD CHEMISTRY: ICD-10-CM

## 2024-05-02 NOTE — TELEPHONE ENCOUNTER
Patient called stating that since being on the doxycycline and her BP was 86/68 and 89/62.  Worried the doxycycline makes her BP drop.  Had her stop in to have it checked it was 114/84 today.  Advised she can stop the doxycycline as she is feeling better and to monitor the next few days.  She would also like to have blood work done next week for her fatigue as its not getting better.  Per our verbal conversation, added blood work.

## 2024-05-03 LAB
NON-UH HIE A/G RATIO: 1.2
NON-UH HIE ALB: 3.8 G/DL (ref 3.4–5)
NON-UH HIE ALK PHOS: 93 UNIT/L (ref 45–117)
NON-UH HIE BASO COUNT: 0.05 X1000 (ref 0–0.2)
NON-UH HIE BASOS %: 1 %
NON-UH HIE BILIRUBIN, TOTAL: 0.5 MG/DL (ref 0.3–1.2)
NON-UH HIE BUN/CREAT RATIO: 16.2
NON-UH HIE BUN: 13 MG/DL (ref 9–23)
NON-UH HIE CALCIUM: 9.3 MG/DL (ref 8.7–10.4)
NON-UH HIE CALCULATED OSMOLALITY: 277 MOSM/KG (ref 275–295)
NON-UH HIE CHLORIDE: 105 MMOL/L (ref 98–107)
NON-UH HIE CO2, VENOUS: 26 MMOL/L (ref 20–31)
NON-UH HIE CREATININE: 0.8 MG/DL (ref 0.5–0.8)
NON-UH HIE DIFF?: NO
NON-UH HIE EOS COUNT: 0.05 X1000 (ref 0–0.5)
NON-UH HIE EOSIN %: 1 %
NON-UH HIE FERRITIN: 55 NG/ML (ref 10–291)
NON-UH HIE FREE T4: 1.48 NG/DL (ref 0.89–1.76)
NON-UH HIE GFR AA: >60
NON-UH HIE GLOBULIN: 3.3 G/DL
NON-UH HIE GLOMERULAR FILTRATION RATE: >60 ML/MIN/1.73M?
NON-UH HIE GLUCOSE: 84 MG/DL (ref 74–106)
NON-UH HIE GOT: 23 UNIT/L (ref 15–37)
NON-UH HIE GPT: 15 UNIT/L (ref 10–49)
NON-UH HIE HCT: 41.3 % (ref 36–46)
NON-UH HIE HGB: 13.9 G/DL (ref 12–16)
NON-UH HIE INSTR WBC: 5.2
NON-UH HIE IRON: 100 UG/DL (ref 50–170)
NON-UH HIE K: 4 MMOL/L (ref 3.5–5.1)
NON-UH HIE LYMPH %: 33.4 %
NON-UH HIE LYMPH COUNT: 1.72 X1000 (ref 1.2–4.8)
NON-UH HIE MCH: 30.4 PG (ref 27–34)
NON-UH HIE MCHC: 33.7 G/DL (ref 32–37)
NON-UH HIE MCV: 90.3 FL (ref 80–100)
NON-UH HIE MONO %: 7.4 %
NON-UH HIE MONO COUNT: 0.38 X1000 (ref 0.1–1)
NON-UH HIE MPV: 6.6 FL (ref 7.4–10.4)
NON-UH HIE NA: 139 MMOL/L (ref 135–145)
NON-UH HIE NEUTROPHIL %: 57.2 %
NON-UH HIE NEUTROPHIL COUNT (ANC): 2.95 X1000 (ref 1.4–8.8)
NON-UH HIE NUCLEATED RBC: 0 /100WBC
NON-UH HIE PLATELET: 298 X10 (ref 150–450)
NON-UH HIE RBC: 4.57 X10 (ref 4.2–5.4)
NON-UH HIE RDW: 13.2 % (ref 11.5–14.5)
NON-UH HIE TIBC: 327 UG/ML (ref 250–425)
NON-UH HIE TOTAL PROTEIN: 7.1 G/DL (ref 5.7–8.2)
NON-UH HIE TSH: 3.49 UIU/ML (ref 0.55–4.78)
NON-UH HIE VIT D 25: 50 NG/ML
NON-UH HIE VITAMIN B12: 375 PG/ML (ref 211–911)
NON-UH HIE WBC: 5.2 X10 (ref 4.5–11)

## 2024-05-06 ENCOUNTER — TELEPHONE (OUTPATIENT)
Dept: PRIMARY CARE | Facility: CLINIC | Age: 79
End: 2024-05-06
Payer: MEDICARE

## 2024-05-06 NOTE — TELEPHONE ENCOUNTER
----- Message from Chetna Parmar, DO sent at 5/4/2024 12:45 PM EDT -----  Please let pt know that her vitamin D,  blood counts, iron, vitamin B12, sugar, electrolytes, thyroid, liver, and kidney function are all normal.

## 2024-07-16 DIAGNOSIS — E03.9 HYPOTHYROIDISM, UNSPECIFIED TYPE: ICD-10-CM

## 2024-07-16 RX ORDER — LEVOTHYROXINE SODIUM 75 UG/1
75 TABLET ORAL EVERY OTHER DAY
Qty: 45 TABLET | Refills: 3 | Status: SHIPPED | OUTPATIENT
Start: 2024-07-16

## 2024-08-27 ENCOUNTER — HOSPITAL ENCOUNTER (OUTPATIENT)
Dept: RADIOLOGY | Facility: EXTERNAL LOCATION | Age: 79
Discharge: HOME | End: 2024-08-27

## 2024-08-27 ENCOUNTER — OFFICE VISIT (OUTPATIENT)
Dept: PRIMARY CARE | Facility: CLINIC | Age: 79
End: 2024-08-27
Payer: MEDICARE

## 2024-08-27 VITALS
HEIGHT: 60 IN | HEART RATE: 90 BPM | SYSTOLIC BLOOD PRESSURE: 150 MMHG | DIASTOLIC BLOOD PRESSURE: 87 MMHG | BODY MASS INDEX: 24.07 KG/M2 | WEIGHT: 122.6 LBS | TEMPERATURE: 98.3 F

## 2024-08-27 DIAGNOSIS — N63.20 MASS OF LEFT BREAST, UNSPECIFIED QUADRANT: ICD-10-CM

## 2024-08-27 DIAGNOSIS — N63.21 BREAST LUMP ON LEFT SIDE AT 1 O'CLOCK POSITION: ICD-10-CM

## 2024-08-27 DIAGNOSIS — R53.83 FATIGUE, UNSPECIFIED TYPE: ICD-10-CM

## 2024-08-27 DIAGNOSIS — N18.31 CHRONIC KIDNEY DISEASE, STAGE 3A (MULTI): ICD-10-CM

## 2024-08-27 DIAGNOSIS — N25.81 SECONDARY HYPERPARATHYROIDISM OF RENAL ORIGIN (MULTI): ICD-10-CM

## 2024-08-27 DIAGNOSIS — E03.9 HYPOTHYROIDISM, UNSPECIFIED TYPE: ICD-10-CM

## 2024-08-27 DIAGNOSIS — E78.5 HYPERLIPIDEMIA, UNSPECIFIED HYPERLIPIDEMIA TYPE: ICD-10-CM

## 2024-08-27 DIAGNOSIS — D72.819 LEUKOPENIA, UNSPECIFIED TYPE: Primary | ICD-10-CM

## 2024-08-27 DIAGNOSIS — I72.8 ANEURYSM, SPLENIC ARTERY (CMS-HCC): ICD-10-CM

## 2024-08-27 DIAGNOSIS — E55.9 VITAMIN D DEFICIENCY: ICD-10-CM

## 2024-08-27 DIAGNOSIS — M31.6 OTHER GIANT CELL ARTERITIS (MULTI): ICD-10-CM

## 2024-08-27 PROCEDURE — 1159F MED LIST DOCD IN RCRD: CPT | Performed by: FAMILY MEDICINE

## 2024-08-27 PROCEDURE — 3075F SYST BP GE 130 - 139MM HG: CPT | Performed by: FAMILY MEDICINE

## 2024-08-27 PROCEDURE — 1036F TOBACCO NON-USER: CPT | Performed by: FAMILY MEDICINE

## 2024-08-27 PROCEDURE — 99213 OFFICE O/P EST LOW 20 MIN: CPT | Performed by: FAMILY MEDICINE

## 2024-08-27 PROCEDURE — 1123F ACP DISCUSS/DSCN MKR DOCD: CPT | Performed by: FAMILY MEDICINE

## 2024-08-27 PROCEDURE — 1160F RVW MEDS BY RX/DR IN RCRD: CPT | Performed by: FAMILY MEDICINE

## 2024-08-27 PROCEDURE — 3078F DIAST BP <80 MM HG: CPT | Performed by: FAMILY MEDICINE

## 2024-08-27 ASSESSMENT — PATIENT HEALTH QUESTIONNAIRE - PHQ9
2. FEELING DOWN, DEPRESSED OR HOPELESS: NOT AT ALL
SUM OF ALL RESPONSES TO PHQ9 QUESTIONS 1 AND 2: 0
1. LITTLE INTEREST OR PLEASURE IN DOING THINGS: NOT AT ALL

## 2024-08-27 NOTE — PROGRESS NOTES
Subjective   Patient ID: Shante Granados is a 79 y.o. female who presents for Breast Mass (Left breast lump on the outer quadrant.  It is mobile and there is brown discoloration in the area.  No pain. No discharge.  ).    HPI   79-year-old female presents complaining of left breast lump that she noticed on sat  Also noticed a bruise in the area.  Is located about 1 o'clock position of the left breast.  Denies any nipple discharge.  No swelling in the axilla.  History of several benign breast biopsies in the past around 3 years ago.  No family history of breast cancer.  No known trauma to the breast.  Does regular self breast exams and this is a definite change  Her last mammogram was 11/29/23-neg      Review of Systems  ROS as stated in HPI    Objective   /87   Pulse 90   Temp 36.8 °C (98.3 °F)   Ht 1.524 m (5')   Wt 55.6 kg (122 lb 9.6 oz)   BMI 23.94 kg/m²     Physical Exam  Constitutional: A&O; NAD  Neuro: No gross neuro deficits     Psych: Judgment, orientation, mood, affect, insight wnl   Breast:  left breast with small lump felt around the 1 o'clock position with faint ecchymosis noted.  Nontender.   No nipple discharge. Axillae normal.     Assessment/Plan   Problem List Items Addressed This Visit             ICD-10-CM    Hypothyroid E03.9    Relevant Orders    Thyroid Stimulating Hormone    Thyroxine, Free    Hyperlipidemia E78.5    Relevant Orders    Comprehensive Metabolic Panel    Lipid Panel    Leukopenia - Primary D72.819    Relevant Orders    CBC and Auto Differential    Aneurysm, splenic artery (CMS-HCC) I72.8    Fatigue R53.83    Vitamin D deficiency E55.9    Relevant Orders    Vitamin D 25-Hydroxy,Total (for eval of Vitamin D levels)    Other giant cell arteritis (Multi) M31.6    Secondary hyperparathyroidism of renal origin (Multi) N25.81    Chronic kidney disease, stage 3a (Multi) N18.31     Other Visit Diagnoses         Codes    Mass of left breast, unspecified quadrant     N63.20     Breast lump on left side at 1 o'clock position     N63.21    Relevant Orders    BI mammo left diagnostic tomosynthesis (Completed)    BI US breast complete left (Completed)        Breast exam done today.  Check diagnostic mammogram and ultrasound of left breast.  Continue to monitor blood pressure.  Check labs prior to upcoming Medicare wellness visit.

## 2024-08-29 ENCOUNTER — TELEPHONE (OUTPATIENT)
Dept: PRIMARY CARE | Facility: CLINIC | Age: 79
End: 2024-08-29
Payer: MEDICARE

## 2024-08-29 NOTE — TELEPHONE ENCOUNTER
----- Message from Chetna Parmar sent at 8/28/2024  3:53 PM EDT -----  Looks like biopsy recommendations were discussed with the patient at the time of her exam by the breast center patient navigator.   Please call pt to make sure she is set up for the biopsy and doesn't need anything

## 2024-09-09 PROCEDURE — 88368 INSITU HYBRIDIZATION MANUAL: CPT | Performed by: PATHOLOGY

## 2024-09-09 PROCEDURE — 88305 TISSUE EXAM BY PATHOLOGIST: CPT

## 2024-09-09 PROCEDURE — 88368 INSITU HYBRIDIZATION MANUAL: CPT

## 2024-09-09 PROCEDURE — 88305 TISSUE EXAM BY PATHOLOGIST: CPT | Performed by: PATHOLOGY

## 2024-09-18 ENCOUNTER — LAB REQUISITION (OUTPATIENT)
Dept: LAB | Facility: HOSPITAL | Age: 79
End: 2024-09-18
Payer: MEDICARE

## 2024-09-23 LAB
LAB AP ASR DISCLAIMER: NORMAL
LABORATORY COMMENT REPORT: NORMAL
PATH REPORT.FINAL DX SPEC: NORMAL
PATH REPORT.TOTAL CANCER: NORMAL

## 2024-10-04 LAB
NON-UH HIE A/G RATIO: 1.2
NON-UH HIE ALB: 3.8 G/DL (ref 3.4–5)
NON-UH HIE ALK PHOS: 92 UNIT/L (ref 45–117)
NON-UH HIE BASO COUNT: 0.06 X1000 (ref 0–0.2)
NON-UH HIE BASOS %: 1.3 %
NON-UH HIE BILIRUBIN, TOTAL: 0.9 MG/DL (ref 0.3–1.2)
NON-UH HIE BUN/CREAT RATIO: 16.2
NON-UH HIE BUN: 13 MG/DL (ref 9–23)
NON-UH HIE CALCIUM: 9.3 MG/DL (ref 8.7–10.4)
NON-UH HIE CALCULATED LDL CHOLESTEROL: 115 MG/DL (ref 60–130)
NON-UH HIE CALCULATED OSMOLALITY: 279 MOSM/KG (ref 275–295)
NON-UH HIE CHLORIDE: 105 MMOL/L (ref 98–107)
NON-UH HIE CHOLESTEROL: 213 MG/DL (ref 100–200)
NON-UH HIE CO2, VENOUS: 28 MMOL/L (ref 20–31)
NON-UH HIE CREATININE: 0.8 MG/DL (ref 0.5–0.8)
NON-UH HIE DIFF?: NO
NON-UH HIE EOS COUNT: 0.16 X1000 (ref 0–0.5)
NON-UH HIE EOSIN %: 3.8 %
NON-UH HIE FREE T4: 1.96 NG/DL (ref 0.89–1.76)
NON-UH HIE GFR AA: >60
NON-UH HIE GLOBULIN: 3.2 G/DL
NON-UH HIE GLOMERULAR FILTRATION RATE: >60 ML/MIN/1.73M?
NON-UH HIE GLUCOSE: 89 MG/DL (ref 74–106)
NON-UH HIE GOT: 20 UNIT/L (ref 15–37)
NON-UH HIE GPT: 12 UNIT/L (ref 10–49)
NON-UH HIE HCT: 40.9 % (ref 36–46)
NON-UH HIE HDL CHOLESTEROL: 78 MG/DL (ref 40–60)
NON-UH HIE HGB: 13.7 G/DL (ref 12–16)
NON-UH HIE INSTR WBC: 4.2
NON-UH HIE K: 4.2 MMOL/L (ref 3.5–5.1)
NON-UH HIE LYMPH %: 29.6 %
NON-UH HIE LYMPH COUNT: 1.24 X1000 (ref 1.2–4.8)
NON-UH HIE MCH: 30.4 PG (ref 27–34)
NON-UH HIE MCHC: 33.4 G/DL (ref 32–37)
NON-UH HIE MCV: 91 FL (ref 80–100)
NON-UH HIE MONO %: 9.7 %
NON-UH HIE MONO COUNT: 0.41 X1000 (ref 0.1–1)
NON-UH HIE MPV: 7.4 FL (ref 7.4–10.4)
NON-UH HIE NA: 140 MMOL/L (ref 135–145)
NON-UH HIE NEUTROPHIL %: 55.6 %
NON-UH HIE NEUTROPHIL COUNT (ANC): 2.34 X1000 (ref 1.4–8.8)
NON-UH HIE NUCLEATED RBC: 0 /100WBC
NON-UH HIE PLATELET: 240 X10 (ref 150–450)
NON-UH HIE RBC: 4.49 X10 (ref 4.2–5.4)
NON-UH HIE RDW: 13.3 % (ref 11.5–14.5)
NON-UH HIE TOTAL CHOL/HDL CHOL RATIO: 2.7
NON-UH HIE TOTAL PROTEIN: 7 G/DL (ref 5.7–8.2)
NON-UH HIE TRIGLYCERIDES: 100 MG/DL (ref 30–150)
NON-UH HIE TSH: 1.69 UIU/ML (ref 0.55–4.78)
NON-UH HIE VIT D 25: 49 NG/ML
NON-UH HIE WBC: 4.2 X10 (ref 4.5–11)

## 2024-10-08 ENCOUNTER — TELEPHONE (OUTPATIENT)
Dept: PRIMARY CARE | Facility: CLINIC | Age: 79
End: 2024-10-08

## 2024-10-08 ENCOUNTER — CLINICAL SUPPORT (OUTPATIENT)
Dept: PRIMARY CARE | Facility: CLINIC | Age: 79
End: 2024-10-08
Payer: MEDICARE

## 2024-10-08 DIAGNOSIS — E03.9 HYPOTHYROIDISM, UNSPECIFIED TYPE: ICD-10-CM

## 2024-10-08 DIAGNOSIS — Z23 ENCOUNTER FOR IMMUNIZATION: ICD-10-CM

## 2024-10-08 PROCEDURE — G0008 ADMIN INFLUENZA VIRUS VAC: HCPCS | Performed by: FAMILY MEDICINE

## 2024-10-08 PROCEDURE — 90662 IIV NO PRSV INCREASED AG IM: CPT | Performed by: FAMILY MEDICINE

## 2024-10-08 RX ORDER — LEVOTHYROXINE SODIUM 50 UG/1
50 TABLET ORAL EVERY MORNING
Qty: 90 TABLET | Refills: 0 | Status: SHIPPED | OUTPATIENT
Start: 2024-10-08

## 2024-10-08 NOTE — TELEPHONE ENCOUNTER
----- Message from Chetna Parmar sent at 10/7/2024  4:53 PM EDT -----  I know but since her levels were overactive, Id like her to lower her dose now.  ----- Message -----  From: Debbie Allen CMA  Sent: 10/7/2024   3:06 PM EDT  To: Chetna Parmar DO    She has upcoming appt 10/14 to discuss.  ----- Message -----  From: Chetna Parmar DO  Sent: 10/7/2024  10:15 AM EDT  To: Debbie Allen CMA    Please let pt know that her sugar, electrolytes, liver, and kidney function are all normal.   Her cholesterol was elevated.  notify them of all the lipid #s.  I recommend a healthy diet and exercise and we will cont to monitor  Her WBC was slightly low but stable.   Her thyroid was slightly overactive so I recommend lowering the dose of her levothyroxine   Verify she has been alternating 75/50mcg and if so lower to just 50mcg daily and we can recheck again in 6 wks

## 2024-10-14 ENCOUNTER — APPOINTMENT (OUTPATIENT)
Dept: PRIMARY CARE | Facility: CLINIC | Age: 79
End: 2024-10-14
Payer: MEDICARE

## 2024-10-14 VITALS
WEIGHT: 121.2 LBS | BODY MASS INDEX: 23.8 KG/M2 | SYSTOLIC BLOOD PRESSURE: 138 MMHG | HEIGHT: 60 IN | DIASTOLIC BLOOD PRESSURE: 80 MMHG | TEMPERATURE: 98.4 F | HEART RATE: 74 BPM

## 2024-10-14 DIAGNOSIS — E03.9 HYPOTHYROIDISM, UNSPECIFIED TYPE: ICD-10-CM

## 2024-10-14 DIAGNOSIS — Z00.00 MEDICARE ANNUAL WELLNESS VISIT, SUBSEQUENT: Primary | ICD-10-CM

## 2024-10-14 PROBLEM — N20.0 KIDNEY STONE: Status: ACTIVE | Noted: 2024-10-14

## 2024-10-14 PROBLEM — C50.912 MALIGNANT NEOPLASM OF UNSPECIFIED SITE OF LEFT FEMALE BREAST: Status: ACTIVE | Noted: 2024-10-03

## 2024-10-14 PROBLEM — C50.911 MALIGNANT NEOPLASM OF RIGHT FEMALE BREAST, UNSPECIFIED ESTROGEN RECEPTOR STATUS, UNSPECIFIED SITE OF BREAST: Status: ACTIVE | Noted: 2024-10-14

## 2024-10-14 PROBLEM — M85.80 OSTEOPENIA: Status: ACTIVE | Noted: 2024-10-14

## 2024-10-14 PROBLEM — I67.1 CEREBRAL ANEURYSM (HHS-HCC): Status: ACTIVE | Noted: 2024-10-14

## 2024-10-14 PROCEDURE — 1036F TOBACCO NON-USER: CPT | Performed by: FAMILY MEDICINE

## 2024-10-14 PROCEDURE — 1160F RVW MEDS BY RX/DR IN RCRD: CPT | Performed by: FAMILY MEDICINE

## 2024-10-14 PROCEDURE — 1159F MED LIST DOCD IN RCRD: CPT | Performed by: FAMILY MEDICINE

## 2024-10-14 PROCEDURE — 3075F SYST BP GE 130 - 139MM HG: CPT | Performed by: FAMILY MEDICINE

## 2024-10-14 PROCEDURE — 3079F DIAST BP 80-89 MM HG: CPT | Performed by: FAMILY MEDICINE

## 2024-10-14 PROCEDURE — 1170F FXNL STATUS ASSESSED: CPT | Performed by: FAMILY MEDICINE

## 2024-10-14 PROCEDURE — 1123F ACP DISCUSS/DSCN MKR DOCD: CPT | Performed by: FAMILY MEDICINE

## 2024-10-14 PROCEDURE — G0439 PPPS, SUBSEQ VISIT: HCPCS | Performed by: FAMILY MEDICINE

## 2024-10-14 ASSESSMENT — ACTIVITIES OF DAILY LIVING (ADL)
GROCERY_SHOPPING: INDEPENDENT
BATHING: INDEPENDENT
DRESSING: INDEPENDENT
DOING_HOUSEWORK: INDEPENDENT
TAKING_MEDICATION: INDEPENDENT
MANAGING_FINANCES: INDEPENDENT

## 2024-10-14 ASSESSMENT — PATIENT HEALTH QUESTIONNAIRE - PHQ9
2. FEELING DOWN, DEPRESSED OR HOPELESS: NOT AT ALL
1. LITTLE INTEREST OR PLEASURE IN DOING THINGS: NOT AT ALL
SUM OF ALL RESPONSES TO PHQ9 QUESTIONS 1 AND 2: 0

## 2024-10-14 NOTE — PROGRESS NOTES
Subjective   Reason for Visit: Shante Granados is an 79 y.o. female here for a Medicare Wellness visit.     Past Medical, Surgical, and Family History reviewed and updated in chart.    Reviewed all medications by prescribing practitioner or clinical pharmacist (such as prescriptions, OTCs, herbal therapies and supplements) and documented in the medical record.    HPI  78 yo female presents for medicare wellness visit       recent labs    Lipids up  WBC low but stable  Thyroid slighlty overactive lowered from 75/50 to 50mcg last wk     Cmp wnl    hx HTN- doing well on lisinopril 7.5mg;   occ checks BP- not recently   140s/70-80  - 107- 110s/70s at home      hx HLD- on pravastatin; watches diet and stays active.     hx hypothyroidism-on levothyroxine 50mcg/75mcg alternating  thyroid biopsy in 20s-diagnosed with hashimotos. no neck changes.      hx leukopenia- hasn't seen a heme for in past. has been stable for yrs.      2010 dexa-osteopenia   defers f/u DEXA b/c wont take any meds for it.   takes vit D   does not take a Ca supplement - due to hx of kidney stones      sees gyn for exams/pelvics every other yr- dr avila-hx ovarian cysts  Hx of recent breast CA s/p left partial mastectomy for infiltrating ductal carcinoma with dr finn.  She has upcoming appt with onco   last bilateral mammo 11/2023;  no right breast changes    6/2024 colonoscopy  hx of polyps. f/u 5 yrs. no bowel changes.  6/2024 EGD +gastritis; hx barretts in past GI dr key; on pantoprazole 20mg QD; seems to control reflux.       Zostavax 2010; shingrix-had  Pneumovax at 67yo   had prevnar 13 in nov 2015  flu shot- had  RSV- not yet  covid shots- had recent booster     wears glasses and sees optho. dr guajardo;   She has seen her dentist for regular cleanings   She denies any chest pains, palpitations, edema, shortness of breath, abdominal pains, nausea, vomiting, diarrhea, constipation, blood in stool, melena.   Sees derm for skin checks. dr salas.  hx rosacea     hx of splenic aneurysm- sees vascular dr gonzalez 2020;   Saw dr kwon neurosurg for possible aneurysm- had recent fu MRI last wk      GI dr kye- liver cyts were stable.      /80   Pulse 74   Temp 36.9 °C (98.4 °F)   Ht 1.524 m (5')   Wt 55 kg (121 lb 3.2 oz)   BMI 23.67 kg/m²   Patient Self Assessment of Health Status  Patient Self Assessment: Good    Nutrition and Exercise  Current Diet: Well Balanced Diet  Adequate Fluid Intake: Yes  Caffeine: Yes  Exercise Frequency: Regularly    Functional Ability/Level of Safety  Cognitive Impairment Observed: No cognitive impairment observed    Home Safety Risk Factors: None    Patient Care Team:  Chetna Parmar DO as PCP - General  Chetna Parmar DO as PCP - [facility] ACO Attributed Provider     Review of Systems  ROS as stated in HPI    Medicare Wellness Billing Compliance Satisfied    *This is a visual tool to show completion of required items on the day of the visit. Green checks will only appear on the date of visit.      Objective   Vitals:  /80   Pulse 74   Temp 36.9 °C (98.4 °F)   Ht 1.524 m (5')   Wt 55 kg (121 lb 3.2 oz)   BMI 23.67 kg/m²       Physical Exam  Constitutional: A&O; NAD  HEENT: conjunctiva normal. EOMI; PERRLA; lids normal; TM's clear;   Neck: supple; No lymphadenopathy   Heart: RRR     Lungs: CTA bilateral   Abd: Soft, Nontender, Nondistended  Ext:  No edema;    Neuro: No gross neuro deficits     Psych: Judgment, orientation, mood, affect, insight wnl     Assessment/Plan   Problem List Items Addressed This Visit       Hypothyroid    Relevant Orders    TSH    T4, free     Other Visit Diagnoses       Medicare annual wellness visit, subsequent    -  Primary          reviewed recent labs   Lowered levoto 50mcg/day  Recheck thyroid in 6 wks  sees gyn for exams every other yr- dr avila   Recent breast CA- fu with onco/breast specialist  6/2024 colonoscopy f/u 5 yrs   RSV-not yet  Other immunizations UTD  defers f/u  bone density test.  recommend Calcium + Vitamin D supplement.   healthy lifestyle-diet, exercise.   monitor BP at home- if consistently elevated, plan to inc lisinopril from 7.5mg to 10mg

## 2024-10-28 ENCOUNTER — TELEPHONE (OUTPATIENT)
Dept: PRIMARY CARE | Facility: CLINIC | Age: 79
End: 2024-10-28
Payer: MEDICARE

## 2024-10-28 DIAGNOSIS — L30.9 DERMATITIS: ICD-10-CM

## 2024-10-28 RX ORDER — TRIAMCINOLONE ACETONIDE 1 MG/G
CREAM TOPICAL 2 TIMES DAILY PRN
Qty: 15 G | Refills: 5 | Status: SHIPPED | OUTPATIENT
Start: 2024-10-28

## 2024-10-29 PROCEDURE — 77334 RADIATION TREATMENT AID(S): CPT | Performed by: RADIOLOGY

## 2024-10-29 PROCEDURE — 77300 RADIATION THERAPY DOSE PLAN: CPT | Performed by: RADIOLOGY

## 2024-10-29 PROCEDURE — 77295 3-D RADIOTHERAPY PLAN: CPT | Performed by: RADIOLOGY

## 2024-11-04 PROCEDURE — 77427 RADIATION TX MANAGEMENT X5: CPT | Performed by: RADIOLOGY

## 2024-11-04 PROCEDURE — 77280 THER RAD SIMULAJ FIELD SMPL: CPT | Performed by: RADIOLOGY

## 2024-11-05 PROCEDURE — 77014 CHG CT GUIDANCE RADIATION THERAPY FLDS PLACEMENT: CPT | Performed by: RADIOLOGY

## 2024-11-06 PROCEDURE — 77014 CHG CT GUIDANCE RADIATION THERAPY FLDS PLACEMENT: CPT | Performed by: RADIOLOGY

## 2024-11-07 PROCEDURE — 77014 CHG CT GUIDANCE RADIATION THERAPY FLDS PLACEMENT: CPT | Performed by: RADIOLOGY

## 2024-11-08 PROCEDURE — 77014 CHG CT GUIDANCE RADIATION THERAPY FLDS PLACEMENT: CPT | Performed by: RADIOLOGY

## 2024-12-02 LAB
NON-UH HIE FREE T4: 1.45 NG/DL (ref 0.89–1.76)
NON-UH HIE TSH: 5.52 UIU/ML (ref 0.55–4.78)

## 2024-12-06 DIAGNOSIS — E03.9 HYPOTHYROIDISM, UNSPECIFIED TYPE: ICD-10-CM

## 2025-01-06 ENCOUNTER — APPOINTMENT (OUTPATIENT)
Dept: PRIMARY CARE | Facility: CLINIC | Age: 80
End: 2025-01-06
Payer: MEDICARE

## 2025-01-06 VITALS
WEIGHT: 123 LBS | TEMPERATURE: 98.5 F | DIASTOLIC BLOOD PRESSURE: 85 MMHG | HEART RATE: 88 BPM | HEIGHT: 60 IN | BODY MASS INDEX: 24.15 KG/M2 | SYSTOLIC BLOOD PRESSURE: 137 MMHG

## 2025-01-06 DIAGNOSIS — C50.912 MALIGNANT NEOPLASM OF LEFT FEMALE BREAST, UNSPECIFIED ESTROGEN RECEPTOR STATUS, UNSPECIFIED SITE OF BREAST: ICD-10-CM

## 2025-01-06 DIAGNOSIS — M26.622 LEFT-SIDED TEMPOROMANDIBULAR JOINT PAIN-DYSFUNCTION SYNDROME: Primary | ICD-10-CM

## 2025-01-06 PROBLEM — M31.6 OTHER GIANT CELL ARTERITIS: Status: RESOLVED | Noted: 2024-08-27 | Resolved: 2025-01-06

## 2025-01-06 PROBLEM — N18.31 CHRONIC KIDNEY DISEASE, STAGE 3A (MULTI): Status: RESOLVED | Noted: 2024-08-27 | Resolved: 2025-01-06

## 2025-01-06 PROCEDURE — 1036F TOBACCO NON-USER: CPT | Performed by: FAMILY MEDICINE

## 2025-01-06 PROCEDURE — 3075F SYST BP GE 130 - 139MM HG: CPT | Performed by: FAMILY MEDICINE

## 2025-01-06 PROCEDURE — 1159F MED LIST DOCD IN RCRD: CPT | Performed by: FAMILY MEDICINE

## 2025-01-06 PROCEDURE — 1123F ACP DISCUSS/DSCN MKR DOCD: CPT | Performed by: FAMILY MEDICINE

## 2025-01-06 PROCEDURE — 3079F DIAST BP 80-89 MM HG: CPT | Performed by: FAMILY MEDICINE

## 2025-01-06 PROCEDURE — 99213 OFFICE O/P EST LOW 20 MIN: CPT | Performed by: FAMILY MEDICINE

## 2025-01-06 PROCEDURE — 1160F RVW MEDS BY RX/DR IN RCRD: CPT | Performed by: FAMILY MEDICINE

## 2025-01-06 RX ORDER — TAMOXIFEN CITRATE 20 MG/1
20 TABLET ORAL DAILY
COMMUNITY
Start: 2024-11-12

## 2025-01-06 RX ORDER — METHYLPREDNISOLONE 4 MG/1
TABLET ORAL
Qty: 21 TABLET | Refills: 0 | Status: SHIPPED | OUTPATIENT
Start: 2025-01-06 | End: 2025-01-12

## 2025-01-06 NOTE — PROGRESS NOTES
Subjective   Patient ID: Shante Granados is a 79 y.o. female who presents for Earache (Left ear pain that radiates down into her neck for the last 10 days. ).    HPI   78 yo female presents co 10 day hx of left earache  Radiating into left TMJ  Some chronic rhinorrhea but denies any recent URI, congestion, fevers or chills.  Denies any hearing changes.  No drainage from the ear.  No rash.  No recent dental work.  No dental pain.  No history of TMJ.  No history of grinding.  Has been under stress.      Review of Systems  ROS as stated in HPI    Objective   /85   Pulse 88   Temp 36.9 °C (98.5 °F)   Ht 1.524 m (5')   Wt 55.8 kg (123 lb)   BMI 24.02 kg/m²     Physical Exam     Constitutional: A&O; NAD  HEENT: conjunctiva normal. EOMI; PERRLA; lids normal; TM's clear;  tenderness over Left TMJ  Neck: supple; No lymphadenopathy   Heart: RRR     Lungs: CTA bilateral    Neuro: No gross neuro deficits     Psych: Judgment, orientation, mood, affect, insight wnl   Assessment/Plan   Problem List Items Addressed This Visit             ICD-10-CM    Malignant neoplasm of unspecified site of left female breast C50.912     Other Visit Diagnoses         Codes    Left-sided temporomandibular joint pain-dysfunction syndrome    -  Primary M26.622    Relevant Medications    methylPREDNISolone (Medrol Dospak) 4 mg tablets        Try medrol melonie  Fu if persists or worsens

## 2025-01-08 DIAGNOSIS — E03.9 HYPOTHYROIDISM, UNSPECIFIED TYPE: ICD-10-CM

## 2025-01-08 RX ORDER — LEVOTHYROXINE SODIUM 50 UG/1
50 TABLET ORAL
Qty: 90 TABLET | Refills: 3 | Status: SHIPPED | OUTPATIENT
Start: 2025-01-08

## 2025-01-23 ENCOUNTER — TELEPHONE (OUTPATIENT)
Dept: PRIMARY CARE | Facility: CLINIC | Age: 80
End: 2025-01-23
Payer: MEDICARE

## 2025-02-11 DIAGNOSIS — I10 HYPERTENSION, UNSPECIFIED TYPE: ICD-10-CM

## 2025-02-11 DIAGNOSIS — I10 BENIGN ESSENTIAL HTN: ICD-10-CM

## 2025-02-11 DIAGNOSIS — E78.5 HYPERLIPIDEMIA, UNSPECIFIED HYPERLIPIDEMIA TYPE: ICD-10-CM

## 2025-02-12 RX ORDER — PRAVASTATIN SODIUM 20 MG/1
20 TABLET ORAL DAILY
Qty: 90 TABLET | Refills: 3 | Status: SHIPPED | OUTPATIENT
Start: 2025-02-12

## 2025-02-12 RX ORDER — LISINOPRIL 2.5 MG/1
TABLET ORAL
Qty: 90 TABLET | Refills: 3 | Status: SHIPPED | OUTPATIENT
Start: 2025-02-12

## 2025-02-12 RX ORDER — LISINOPRIL 5 MG/1
TABLET ORAL
Qty: 90 TABLET | Refills: 3 | Status: SHIPPED | OUTPATIENT
Start: 2025-02-12

## 2025-04-24 ENCOUNTER — APPOINTMENT (OUTPATIENT)
Dept: PRIMARY CARE | Facility: CLINIC | Age: 80
End: 2025-04-24
Payer: MEDICARE

## 2025-04-28 ENCOUNTER — APPOINTMENT (OUTPATIENT)
Dept: PRIMARY CARE | Facility: CLINIC | Age: 80
End: 2025-04-28
Payer: MEDICARE

## 2025-05-04 NOTE — PROGRESS NOTES
Subjective   Patient ID: Shante Granados is a 80 y.o. female who presents for Follow-up (Blood work done. ).    HPI   81 yo female presents for fu    recent labs   tsh sl elevated but normal free T4    10/2024  Lipids up  WBC low but stable  Thyroid slighlty overactive lowered from 75/50 to 50mcg      Cmp wnl    hx HTN- doing well on lisinopril 7.5mg;   occ checks BP    hx HLD- on pravastatin; watches diet and stays active.     hx hypothyroidism-thyroid biopsy in 20s-diagnosed with hashimotos. no neck changes.      hx leukopenia- hasn't seen a heme for in past. has been stable for yrs.      sees gyn for exams/pelvics every other yr- dr avila-hx ovarian cysts  Hx of recent breast CA s/p left partial mastectomy for infiltrating ductal carcinoma with dr finn.   sees [facility]  had bl mammo 3/2025- recommended 6 mo fu on left    6/2024 colonoscopy  hx of polyps. f/u 5 yrs. no bowel changes.  6/2024 EGD +gastritis; hx barretts in past GI dr key; on pantoprazole 20mg QD; seems to control reflux.       Zostavax 2010; shingrix-had  Pneumovax at 65yo   had prevnar 13 in nov 2015  flu shot- had  RSV- not yet  covid shots- had booster     wears glasses and sees optho. dr guajardo;   She has seen her dentist for regular cleanings   She denies any chest pains, palpitations, edema, shortness of breath, abdominal pains, nausea, vomiting, diarrhea, constipation, blood in stool, melena.   Sees derm for skin checks. dr salas. hx rosacea     hx of splenic aneurysm- sees vascular dr gonzalez 2020;   Saw dr kwon neurosurg for possible aneurysm- had fu 10/2024 CT- stable; recommended fu 2 yrs    GI dr key- liver cyts were stable.         Review of Systems  ROS as stated in HPI    Objective   /71   Pulse 84   Temp 35.8 °C (96.4 °F)   Ht 1.524 m (5')   Wt 55.8 kg (123 lb)   BMI 24.02 kg/m²     Physical Exam  Constitutional: A&O; NAD  HEENT: conjunctiva normal. EOMI; PERRLA; lids normal; TM's clear;   Neck: supple; No  lymphadenopathy   Heart: RRR     Lungs: CTA bilateral   Abd: Soft, Nontender, Nondistended  Ext:  No edema;    Neuro: No gross neuro deficits     Psych: Judgment, orientation, mood, affect, insight wnl   Assessment/Plan   Problem List Items Addressed This Visit           ICD-10-CM    Hypothyroid - Primary E03.9    Relevant Orders    Thyroid Stimulating Hormone    T4, free    Benign essential HTN I10    Relevant Orders    Comprehensive Metabolic Panel    Hyperlipidemia E78.5    Relevant Orders    Comprehensive Metabolic Panel    Lipid Panel    Leukopenia D72.819    Relevant Orders    CBC and Auto Differential    Vitamin D deficiency E55.9    Relevant Orders    Vitamin D 25-Hydroxy,Total (for eval of Vitamin D levels)     reviewed recent thyroid labs - cont 50mcg  sees gyn for exams  dr avila   hx breast CA- fu with onco/breast specialist  6/2024 colonoscopy f/u 5 yrs   RSV-not yet  Other immunizations UTD  10/2024 bone density test +osteoporosis   healthy lifestyle-diet, exercise.   monitor BP at home  recheck labs in 6 mo and fu after for MWV or sooner if needed

## 2025-05-05 LAB
NON-UH HIE FREE T4: 1.36 NG/DL (ref 0.89–1.76)
NON-UH HIE TSH: 5.43 UIU/ML (ref 0.55–4.78)

## 2025-05-08 ENCOUNTER — APPOINTMENT (OUTPATIENT)
Dept: PRIMARY CARE | Facility: CLINIC | Age: 80
End: 2025-05-08
Payer: MEDICARE

## 2025-05-08 VITALS
HEART RATE: 84 BPM | DIASTOLIC BLOOD PRESSURE: 71 MMHG | HEIGHT: 60 IN | SYSTOLIC BLOOD PRESSURE: 117 MMHG | BODY MASS INDEX: 24.15 KG/M2 | WEIGHT: 123 LBS | TEMPERATURE: 96.4 F

## 2025-05-08 DIAGNOSIS — E55.9 VITAMIN D DEFICIENCY: ICD-10-CM

## 2025-05-08 DIAGNOSIS — E78.5 HYPERLIPIDEMIA, UNSPECIFIED HYPERLIPIDEMIA TYPE: ICD-10-CM

## 2025-05-08 DIAGNOSIS — D72.819 LEUKOPENIA, UNSPECIFIED TYPE: ICD-10-CM

## 2025-05-08 DIAGNOSIS — I10 BENIGN ESSENTIAL HTN: ICD-10-CM

## 2025-05-08 DIAGNOSIS — E03.9 HYPOTHYROIDISM, UNSPECIFIED TYPE: Primary | ICD-10-CM

## 2025-05-08 PROCEDURE — 3078F DIAST BP <80 MM HG: CPT | Performed by: FAMILY MEDICINE

## 2025-05-08 PROCEDURE — 1160F RVW MEDS BY RX/DR IN RCRD: CPT | Performed by: FAMILY MEDICINE

## 2025-05-08 PROCEDURE — 99214 OFFICE O/P EST MOD 30 MIN: CPT | Performed by: FAMILY MEDICINE

## 2025-05-08 PROCEDURE — 1159F MED LIST DOCD IN RCRD: CPT | Performed by: FAMILY MEDICINE

## 2025-05-08 PROCEDURE — 1036F TOBACCO NON-USER: CPT | Performed by: FAMILY MEDICINE

## 2025-05-08 PROCEDURE — 3074F SYST BP LT 130 MM HG: CPT | Performed by: FAMILY MEDICINE

## 2025-06-13 ENCOUNTER — OFFICE VISIT (OUTPATIENT)
Dept: PRIMARY CARE | Facility: CLINIC | Age: 80
End: 2025-06-13
Payer: MEDICARE

## 2025-06-13 VITALS
DIASTOLIC BLOOD PRESSURE: 76 MMHG | OXYGEN SATURATION: 97 % | HEIGHT: 60 IN | BODY MASS INDEX: 23.16 KG/M2 | SYSTOLIC BLOOD PRESSURE: 126 MMHG | TEMPERATURE: 97.1 F | WEIGHT: 118 LBS | HEART RATE: 96 BPM

## 2025-06-13 DIAGNOSIS — J01.00 ACUTE NON-RECURRENT MAXILLARY SINUSITIS: Primary | ICD-10-CM

## 2025-06-13 DIAGNOSIS — J40 BRONCHITIS: ICD-10-CM

## 2025-06-13 PROCEDURE — 99213 OFFICE O/P EST LOW 20 MIN: CPT | Performed by: NURSE PRACTITIONER

## 2025-06-13 PROCEDURE — 1159F MED LIST DOCD IN RCRD: CPT | Performed by: NURSE PRACTITIONER

## 2025-06-13 PROCEDURE — 1160F RVW MEDS BY RX/DR IN RCRD: CPT | Performed by: NURSE PRACTITIONER

## 2025-06-13 PROCEDURE — 3078F DIAST BP <80 MM HG: CPT | Performed by: NURSE PRACTITIONER

## 2025-06-13 PROCEDURE — 1036F TOBACCO NON-USER: CPT | Performed by: NURSE PRACTITIONER

## 2025-06-13 PROCEDURE — 3074F SYST BP LT 130 MM HG: CPT | Performed by: NURSE PRACTITIONER

## 2025-06-13 RX ORDER — DOXYCYCLINE 100 MG/1
100 CAPSULE ORAL 2 TIMES DAILY
Qty: 20 CAPSULE | Refills: 0 | Status: SHIPPED | OUTPATIENT
Start: 2025-06-13 | End: 2025-06-23

## 2025-06-13 RX ORDER — PREDNISONE 20 MG/1
TABLET ORAL
Qty: 15 TABLET | Refills: 0 | Status: CANCELLED | OUTPATIENT
Start: 2025-06-13

## 2025-06-13 RX ORDER — METHYLPREDNISOLONE 4 MG/1
TABLET ORAL
Qty: 21 TABLET | Refills: 0 | Status: SHIPPED | OUTPATIENT
Start: 2025-06-13 | End: 2025-06-19

## 2025-06-13 ASSESSMENT — ENCOUNTER SYMPTOMS
SHORTNESS OF BREATH: 0
HEADACHES: 0
SINUS PRESSURE: 1
EYE REDNESS: 0
DIARRHEA: 0
FATIGUE: 0
RHINORRHEA: 1
PALPITATIONS: 0
VOMITING: 0
CHILLS: 0
CHEST TIGHTNESS: 0
ARTHRALGIAS: 0
SORE THROAT: 1
COUGH: 1
ABDOMINAL PAIN: 0
FACIAL SWELLING: 0
FEVER: 0
WHEEZING: 0
EYE DISCHARGE: 0
MYALGIAS: 0

## 2025-06-13 NOTE — PROGRESS NOTES
Subjective   Patient ID: Shante Granados is a 80 y.o. female who presents for URI (Pt presents with uri x1 wk/Has tried sudafed and tylenol).  Last physical: 10/14/24; has next appt scheduled    current sx- cough and runny nose  when did sx start- 7-10 days  did pt take a covid-19 test? N        HPI  Runny nose, stuffy nose, post nasal drip, maxillary pain, cough with occas clear phlegm, throat irritated from coughing x 7-10d  Nasal keeping pt up at night  ST 1st day but gone now    no sob, wheezing, tight upper chest, fever, chills, muscle/jt pain,  ST, new loss of taste or smell, diarrhea, vomiting, nausea, abdominal pain, fatigue, weakness, red eye, rash, bruising, cyanosis, ear pain, ear pressure, pain with deep breath, leg or foot swelling, calf pain  loss of appetite-yes  fluids-yes  has urinated at least 3 times in 24hrs  Seasonal allergies-no  Selftxt-sudafed    No known exposure to COVID-19  No known exposure to strep  No known exposure to influenza  No one sick around the pt      Risk factors:  Chronic disease/comorbidities: htn, cerebral aneurysm, h/o br ca (on tamoxifen)  not a healthcare worker  Age: 65yrs of age and older      No care team member to display     Review of Systems   Constitutional:  Negative for chills, fatigue and fever.   HENT:  Positive for congestion, postnasal drip, rhinorrhea, sinus pressure and sore throat. Negative for ear discharge, ear pain and facial swelling.    Eyes:  Negative for discharge and redness.   Respiratory:  Positive for cough. Negative for chest tightness, shortness of breath and wheezing.    Cardiovascular:  Negative for chest pain, palpitations and leg swelling.   Gastrointestinal:  Negative for abdominal pain, diarrhea and vomiting.   Musculoskeletal:  Negative for arthralgias and myalgias.   Skin:  Negative for rash.   Neurological:  Negative for headaches.       Objective   Visit Vitals  /76   Pulse 96   Temp 36.2 °C (97.1 °F)      BP Readings from  Last 3 Encounters:   06/13/25 126/76   05/08/25 117/71   01/06/25 137/85     Wt Readings from Last 3 Encounters:   06/13/25 53.5 kg (118 lb)   05/08/25 55.8 kg (123 lb)   01/06/25 55.8 kg (123 lb)       Physical Exam  Constitutional:       Appearance: Normal appearance.   HENT:      Head: Normocephalic.      Right Ear: Tympanic membrane, ear canal and external ear normal.      Left Ear: Tympanic membrane, ear canal and external ear normal.      Nose: Nose normal.      Mouth/Throat:      Mouth: Mucous membranes are moist.      Pharynx: No oropharyngeal exudate or posterior oropharyngeal erythema.   Cardiovascular:      Rate and Rhythm: Normal rate and regular rhythm.      Heart sounds: Normal heart sounds.   Pulmonary:      Effort: Pulmonary effort is normal.      Breath sounds: Normal breath sounds. No wheezing, rhonchi or rales.   Lymphadenopathy:      Cervical: No cervical adenopathy.   Neurological:      Mental Status: She is alert.       Assessment/Plan   Diagnoses and all orders for this visit:  Acute non-recurrent maxillary sinusitis  -     doxycycline (Vibramycin) 100 mg capsule; Take 1 capsule (100 mg) by mouth 2 times a day for 10 days. Take with at least 8 ounces (large glass) of water, do not lie down for 30 minutes after  Bronchitis  -     doxycycline (Vibramycin) 100 mg capsule; Take 1 capsule (100 mg) by mouth 2 times a day for 10 days. Take with at least 8 ounces (large glass) of water, do not lie down for 30 minutes after  -     methylPREDNISolone (Medrol Dospak) 4 mg tablets; Take as directed on package.        See patient instructions for full plan

## 2025-06-13 NOTE — PATIENT INSTRUCTIONS
Doxycycline-antibiotic  Medrol dose melonie  Zyrtec or claritin otc 1 a day  Mucinex during day  Mucinex dm for night  Fluids  Rest  Call if sx worsen or change or not starting to get better in 2-3d    Keep oct appt    I will communicate with you via [employer] regarding messages and results. If you need help with this, you can call the support line at [phone].    IT WAS A PLEASURE TO SEE YOU TODAY. THANK YOU FOR CHOOSING US FOR YOUR HEALTHCARE NEEDS.        I will communicate with you via [employer] regarding messages and results. If you need help with this, you can call the support line at [phone].    IT WAS A PLEASURE TO SEE YOU TODAY. THANK YOU FOR CHOOSING US FOR YOUR HEALTHCARE NEEDS.

## 2025-10-14 ENCOUNTER — APPOINTMENT (OUTPATIENT)
Dept: PRIMARY CARE | Facility: CLINIC | Age: 80
End: 2025-10-14
Payer: MEDICARE